# Patient Record
Sex: FEMALE | Race: WHITE | NOT HISPANIC OR LATINO | URBAN - METROPOLITAN AREA
[De-identification: names, ages, dates, MRNs, and addresses within clinical notes are randomized per-mention and may not be internally consistent; named-entity substitution may affect disease eponyms.]

---

## 2023-07-28 ENCOUNTER — APPOINTMENT (RX ONLY)
Dept: URBAN - METROPOLITAN AREA CLINIC 61 | Facility: CLINIC | Age: 68
Setting detail: DERMATOLOGY
End: 2023-07-28

## 2023-07-28 DIAGNOSIS — Z41.9 ENCOUNTER FOR PROCEDURE FOR PURPOSES OTHER THAN REMEDYING HEALTH STATE, UNSPECIFIED: ICD-10-CM

## 2023-07-28 PROCEDURE — ? COSMETIC CONSULTATION: BOTOX

## 2023-07-28 PROCEDURE — ? PHOTO-DOCUMENTATION

## 2023-07-28 PROCEDURE — ? COSMETIC CONSULTATION: FILLERS

## 2023-07-28 PROCEDURE — ? FILLERS

## 2023-07-28 NOTE — PROCEDURE: FILLERS
Jawline Filler Volume In Cc: 0
Anesthesia Type: 1% lidocaine with epinephrine
Nasolabial Folds Filler Volume In Cc: 1
Include Cannula Information In Note?: No
Aspiration Statement: Aspiration was performed prior to injecting site with filler.
Cheeks Filler Volume In Cc: 4
Filler: Juvederm Ultra Plus
Map Statment: See Attach Map for Complete Details
Post-Care Instructions: After the procedure, patient instructed to apply ice to reduce swelling.
Filler: Juvederm Voluma XC
Consent: Written consent obtained. Risks include but not limited to bruising, beading, irregular texture, ulceration, infection, allergic reaction, scar formation, incomplete augmentation, temporary nature, and procedural pain.
Filler: Juvederm Volux XC
Detail Level: Detailed
Additional Anesthesia Volume In Cc: 6
Jawline Filler Volume In Cc: 3
Filler: Juvederm Ultra
Anesthesia Volume In Cc: 0.5

## 2023-08-17 ENCOUNTER — APPOINTMENT (RX ONLY)
Dept: URBAN - METROPOLITAN AREA CLINIC 61 | Facility: CLINIC | Age: 68
Setting detail: DERMATOLOGY
End: 2023-08-17

## 2023-08-17 DIAGNOSIS — Z41.9 ENCOUNTER FOR PROCEDURE FOR PURPOSES OTHER THAN REMEDYING HEALTH STATE, UNSPECIFIED: ICD-10-CM

## 2023-08-17 PROCEDURE — ? COSMETIC FOLLOW-UP

## 2023-08-17 PROCEDURE — ? PHOTO-DOCUMENTATION
